# Patient Record
Sex: FEMALE | Race: BLACK OR AFRICAN AMERICAN | Employment: OTHER | URBAN - METROPOLITAN AREA
[De-identification: names, ages, dates, MRNs, and addresses within clinical notes are randomized per-mention and may not be internally consistent; named-entity substitution may affect disease eponyms.]

---

## 2018-11-23 ENCOUNTER — ED HISTORICAL/CONVERTED ENCOUNTER (OUTPATIENT)
Dept: OTHER | Age: 63
End: 2018-11-23

## 2019-05-20 ENCOUNTER — OP HISTORICAL/CONVERTED ENCOUNTER (OUTPATIENT)
Dept: OTHER | Age: 64
End: 2019-05-20

## 2020-05-06 ENCOUNTER — TELEPHONE (OUTPATIENT)
Dept: FAMILY MEDICINE CLINIC | Age: 65
End: 2020-05-06

## 2020-05-06 NOTE — TELEPHONE ENCOUNTER
----- Message from Darien Lloyd sent at 5/6/2020 12:13 PM EDT -----  Regarding: / telephone  Contact: [phone]  Caller's first and last name: n/a  Reason for call: voicemails  Callback required yes/no and why: yes  Best contact number(s): [phone]  Details to clarify the request:     Advised she is getting all the voicemails and would like to be contacted as soon as possible.

## 2022-01-25 ENCOUNTER — APPOINTMENT (OUTPATIENT)
Dept: GENERAL RADIOLOGY | Age: 67
End: 2022-01-25
Attending: STUDENT IN AN ORGANIZED HEALTH CARE EDUCATION/TRAINING PROGRAM
Payer: MEDICARE

## 2022-01-25 ENCOUNTER — HOSPITAL ENCOUNTER (EMERGENCY)
Age: 67
Discharge: HOME | End: 2022-01-25
Attending: STUDENT IN AN ORGANIZED HEALTH CARE EDUCATION/TRAINING PROGRAM
Payer: MEDICARE

## 2022-01-25 VITALS
OXYGEN SATURATION: 99 % | HEIGHT: 65 IN | HEART RATE: 93 BPM | DIASTOLIC BLOOD PRESSURE: 79 MMHG | RESPIRATION RATE: 17 BRPM | TEMPERATURE: 99 F | BODY MASS INDEX: 24.99 KG/M2 | WEIGHT: 150 LBS | SYSTOLIC BLOOD PRESSURE: 137 MMHG

## 2022-01-25 DIAGNOSIS — S93.04XA DISLOCATION OF RIGHT ANKLE JOINT, INITIAL ENCOUNTER: ICD-10-CM

## 2022-01-25 DIAGNOSIS — S82.851A CLOSED TRIMALLEOLAR FRACTURE OF RIGHT ANKLE, INITIAL ENCOUNTER: Primary | ICD-10-CM

## 2022-01-25 PROCEDURE — 73610 X-RAY EXAM OF ANKLE: CPT

## 2022-01-25 PROCEDURE — 99285 EMERGENCY DEPT VISIT HI MDM: CPT

## 2022-01-25 PROCEDURE — 75810000303 HC CLSD TRMT  FRACTURE/DISLOCATION W/  ANES

## 2022-01-25 PROCEDURE — 74011250636 HC RX REV CODE- 250/636: Performed by: STUDENT IN AN ORGANIZED HEALTH CARE EDUCATION/TRAINING PROGRAM

## 2022-01-25 PROCEDURE — 74011000250 HC RX REV CODE- 250: Performed by: STUDENT IN AN ORGANIZED HEALTH CARE EDUCATION/TRAINING PROGRAM

## 2022-01-25 RX ORDER — PROPOFOL 10 MG/ML
0.1 INJECTION, EMULSION INTRAVENOUS
Status: DISCONTINUED | OUTPATIENT
Start: 2022-01-25 | End: 2022-01-25 | Stop reason: DRUGHIGH

## 2022-01-25 RX ORDER — KETAMINE HYDROCHLORIDE 50 MG/ML
1 INJECTION, SOLUTION INTRAMUSCULAR; INTRAVENOUS ONCE
Status: COMPLETED | OUTPATIENT
Start: 2022-01-25 | End: 2022-01-25

## 2022-01-25 RX ORDER — OXYCODONE HYDROCHLORIDE 5 MG/1
5 TABLET ORAL
Qty: 12 TABLET | Refills: 0 | Status: SHIPPED | OUTPATIENT
Start: 2022-01-25 | End: 2022-01-28

## 2022-01-25 RX ORDER — PROPOFOL 10 MG/ML
1 INJECTION, EMULSION INTRAVENOUS
Status: COMPLETED | OUTPATIENT
Start: 2022-01-25 | End: 2022-01-25

## 2022-01-25 RX ADMIN — SODIUM CHLORIDE 1000 ML: 9 INJECTION, SOLUTION INTRAVENOUS at 19:05

## 2022-01-25 RX ADMIN — KETAMINE HYDROCHLORIDE 34 MG: 50 INJECTION INTRAMUSCULAR; INTRAVENOUS at 19:06

## 2022-01-25 RX ADMIN — PROPOFOL 68 MG: 10 INJECTION, EMULSION INTRAVENOUS at 19:06

## 2022-01-25 NOTE — ED PROVIDER NOTES
EMERGENCY DEPARTMENT HISTORY AND PHYSICAL EXAM      Date: 1/25/2022  Patient Name: Venson Moritz    History of Presenting Illness     Chief Complaint   Patient presents with    Fracture       HPI: Venson Moritz, 77 y.o. female with a past medical history of hypertension and hyperlipidemia presenting for concern for R ankle fracture. She is unsure on how she sustained that fracture however she does describe a mechanical fall. She went to an outside doctor who ordered an x-ray and showed a trimalleolar fracture with concern for dislocation and skin tenting. Patient has no numbness or cyanosis of the toes. Is able to wiggle her toes. She reports pain over the medial and lateral aspect of the ankle. PCP: Cipriano Emery MD    Current Outpatient Medications   Medication Sig Dispense Refill    oxyCODONE IR (Roxicodone) 5 mg immediate release tablet Take 1 Tablet by mouth every six (6) hours as needed for Pain for up to 3 days. Max Daily Amount: 20 mg. 12 Tablet 0    nebivolol (BYSTOLIC) 5 mg tablet Take  by mouth daily.  rosuvastatin (CRESTOR) 10 mg tablet Take 10 mg by mouth daily.            Medical History   I reviewed the medical, surgical, family, and social history, as well as allergies:    Past Medical History:  Past Medical History:   Diagnosis Date    Hyperlipidemia     Hypertension        Past Surgical History:  Past Surgical History:   Procedure Laterality Date    HX TOTAL ABDOMINAL HYSTERECTOMY         Family History:  Family History   Problem Relation Age of Onset    High Cholesterol Mother     Heart Surgery Mother 61        stent    Coronary Art Dis Mother     Hypertension Father     Hypertension Sister     Heart Surgery Brother 27    Heart Attack Brother     Coronary Art Dis Brother        Social History:  Social History     Tobacco Use    Smoking status: Former Smoker     Packs/day: 0.20     Years: 20.00     Pack years: 4.00     Types: Cigarettes    Smokeless tobacco: Never Used   Substance Use Topics    Alcohol use: Yes     Alcohol/week: 0.0 standard drinks     Comment: (3) 8oz of liquor    Drug use: No       Allergies:  No Known Allergies    Review of Systems     Review of Systems  All other systems negative. Physical Exam and Vital Signs   Vital Signs - Reviewed the patient's vital signs. Patient Vitals for the past 12 hrs:   Temp Pulse Resp BP SpO2   01/25/22 2011  (!) 104 16 (!) 163/78 99 %   01/25/22 1939  99 14 137/74 100 %   01/25/22 1927 99 °F (37.2 °C) 98 12 (!) 129/91 98 %   01/25/22 1926  99 (!) 99  100 %   01/25/22 1923  (!) 116 18 130/82 99 %   01/25/22 1911   17 112/85 100 %   01/25/22 1826 98.5 °F (36.9 °C) 92 18 125/78 100 %   01/25/22 1449 98.1 °F (36.7 °C) (!) 124 18 122/73 95 %       Physical Exam:    GENERAL: not in apparent distress  HEENT:  * EOMI  * Head atraumatic  CV:  * warm extremities  * no cyanosis  PULMONARY: no respiratory distress, non labored breathing, no audible wheezing or stridor, no accessory muscle use  ABDOMEN: soft, moving in bed and pulls to seated position without grimace or pain  EXTREMITIES/BACK: moving four extremities without limitation  JOINT: R ankle  * Splinted on arrival  * Noted swelling and deformity  * No warmth to palpation  * Noted tenderness to palpation  * Limited passive and active ROM due to pain  * unable to bear weight  * normal DP and PT pulses and sensation and movement of toes  SKIN: no rashes or signs of trauma  NEURO:  * Speech clear  * GCS 15      Medical Decision Making and ED Course   - I am the first and primary provider for this patient and am the primary provider of record. - I reviewed the vital signs, available nursing notes, past medical history, past surgical history, family history and social history. - Initial assessment performed. The patients presenting problems have been discussed, and the staff are in agreement with the care plan formulated and outlined with them.   I have encouraged them to ask questions as they arise throughout their visit. - Available medical records, nursing notes, old EKGs, and EMS run sheets (if patient was EMS transported) were reviewed    MDM:   Patient is a 77 y.o. female presenting for R ankle deformity. Vitals reveal tachycardia and physical exam reveals deformity of R ankle  without neurovascular compromise. Based on the history, physical exam, risk factors, and vitals signs, differential includes: sprain, strain, ligamentous injury, fracture, dislocation, soft tissue contusion. Nerve and vascular damage is unlikely as the patient has a completely benign neurovascular exam. Presentation is very likely an ankle fracture. XR ordered. Results     Labs:  No results found for this or any previous visit (from the past 12 hour(s)). Radiologic Studies:  CT Results  (Last 48 hours)    None        CXR Results  (Last 48 hours)    None          Medications ordered:  Medications   ketamine (KETALAR) 50 mg/mL injection 68 mg (34 mg IntraVENous Given 1/25/22 1906)   sodium chloride 0.9 % bolus infusion 1,000 mL (1,000 mL IntraVENous New Bag 1/25/22 1905)   propofoL (DIPRIVAN) 10 mg/mL injection 68 mg (68 mg IntraVENous Given 1/25/22 1906)        ED Course     ED Course:     ED Course as of 01/25/22 2058 Tue Jan 25, 2022   1600 XR: Trimalleolar fracture dislocation right ankle. [SS]   3233 Dr. Sabrina Colon assessed patient at bedside. Outpatient follow up after ED reduction. Will do proc sedation for reduction and apply sugartong and post splint. [SS]   1932 Patient tolerated reduction under proc sedation well. Observing in Ed till return to baseline mentation. [SS]   2055 Despite the patient still having retained fracture of the lower aspect of the fibula, the trimalleolar lateral displacement fracture has been reduced.   Will discharge with follow-up with orthopedics for further management. [SS]   2055 After the management of the injury as documented above and application of the splint, repeat exam shows an intact neurovascular exam of the R foot without any cyanosis or other findings. Patient denies any worsening pain, numbness, or color change. Patient was given instructions to look out for cyanosis/skin color change, numbness, weakness, or pain and return immediately in case it happens or go to the [facility] ED.   [SS]      ED Course User Index  [SS] Jerod Will MD         Final Disposition     Disposition: Condition stable  DC- Adult Discharges: All of the diagnostic tests were reviewed and questions answered. Diagnosis, care plan and treatment options were discussed. The patient understands the instructions and will follow up as directed. The patients results have been reviewed with them. They have been counseled regarding their diagnosis. The patient verbally convey understanding and agreement of the signs, symptoms, diagnosis, treatment and prognosis and additionally agrees to follow up as recommended with their PCP in 24 - 48 hours. They also agree with the care-plan and convey that all of their questions have been answered. I have also put together some discharge instructions for them that include: 1) educational information regarding their diagnosis, 2) how to care for their diagnosis at home, as well a 3) list of reasons why they would want to return to the ED prior to their follow-up appointment, should their condition change. DISCHARGE PLAN:  1. Current Discharge Medication List      START taking these medications    Details   oxyCODONE IR (Roxicodone) 5 mg immediate release tablet Take 1 Tablet by mouth every six (6) hours as needed for Pain for up to 3 days. Max Daily Amount: 20 mg.  Qty: 12 Tablet, Refills: 0    Associated Diagnoses: Closed trimalleolar fracture of right ankle, initial encounter         CONTINUE these medications which have NOT CHANGED    Details   nebivolol (BYSTOLIC) 5 mg tablet Take  by mouth daily.         rosuvastatin (CRESTOR) 10 mg tablet Take 10 mg by mouth daily. 2.   Follow-up Information     Follow up With Specialties Details Why Maria Isabel Bertrand MD Orthopedic Surgery Schedule an appointment as soon as possible for a visit in 3 days  [address] [place] [facility] [address] [phone] [address] [facility] EMERGENCY DEPT Emergency Medicine Go to  If symptoms worsen [address] [facility] [address] [phone]        3. Return to ED if worse   4. Current Discharge Medication List      START taking these medications    Details   oxyCODONE IR (Roxicodone) 5 mg immediate release tablet Take 1 Tablet by mouth every six (6) hours as needed for Pain for up to 3 days. Max Daily Amount: 20 mg.  Qty: 12 Tablet, Refills: 0  Start date: 1/25/2022, End date: 1/28/2022    Associated Diagnoses: Closed trimalleolar fracture of right ankle, initial encounter             ED Procedures & Consultations   Performed by: Alexandro Pearson MD  Procedures       PROCEDURE: Procedural Sedation for R ankle reduction of fracture-dislocation    Performed by: Alexandro Pearson MD  Date: 1/25/2022    - Indication: R ankle fracture  - Chart was reviewed for allergies and medical history. - consent obtained from patient prior to the procedure. Indications, risks, and benefits were explained at length including cardiac arrest, hypotension, hypoxia, intubation, vomiting, allergic reaction, hemodynamic compromise, etc.    PRE-PROCEDURAL ASSESSMENT  - Consent obtained as above  - Equipment readily available at bedside included multiple oxygen sources, intubation equipment, BMV, ETCO2, monitors, suction, bougie, Glidoscope, cric kit, medications.  - Nursing and support staff present at bedside.  - The patient was placed on a cardiac monitor including continuous pulse oximetry. Hemodynamic status was optimized as much as possible prior to initiating conscious sedation procedure.   - Patient placed on nasal cannula at 2L O2 with capnography. - Pre-procedural assessment was done to assess for dentures, intraoral foreign bodies, anatomy, secretions, and other conditions that may cause difficulty in endotracheal intubation. PROCEDURAL SEDATION  - An appropriate time out was taken documenting proper side and proper patient. My hands were washed immediately prior to the procedure. - Medications required: propofol, ketamine  - Complication: Tolerated well without complication. no hypoxic episodes. - Time: Total intra-service time with patient was 30 minutes. POST-PROCEDURE REASSESSMENT  - Patient tolerated procedure well and is back to baseline.  - Patient returned to pre-sedation level of awareness.  - Normal vitals signs noted. - Respiratory function, cardiovascular function, temperature, and mental status returned to pre-anesthetic state.  - Postprocedural pain was controlled with oxycodone  - The patient did tolerate p.o. intake    PROCEDURE: Reduction of R ankle    Performed by: Reid Perez MD  Date: 1/25/2022    - Indication: R ankle fracture dislocation  - consent obtained from patient prior to the procedure. Indications, risks, and benefits were explained at length including pain, fracture, failed reduction, neurovascular injury, etc..    An appropriate time out was taken documenting proper side and proper patient. My hands were washed immediately prior to the procedure. - Location details: R ankle  - XR results: trimal fx with dislocation  - Anesthesia: proc sedation as above  - Sedation required? yes  - Reduction Procedure: axial pull and closed manipulation  - Results: post reduction alignement improved  - Complication: Tolerated well without complication. <2sec cap refill. Tendons intact. Normal neurovascular exam.   - Repeat XR: improved alignment - see report for full details          Diagnosis     Clinical Impression:   1.  Closed trimalleolar fracture of right ankle, initial encounter 2. Dislocation of right ankle joint, initial encounter        Attestations:    Patricia Aguilar MD    Please note that this dictation was completed with Quigo, the computer voice recognition software. Quite often unanticipated grammatical, syntax, homophones, and other interpretive errors are inadvertently transcribed by the computer software. Please disregard these errors. Please excuse any errors that have escaped final proofreading. Thank you.

## 2022-01-25 NOTE — CONSULTS
ORTHOPEDIC CONSULT    Patient: Sesar Moreno MRN: [id number]  SSN: [id number]    YOB: 1955  Age: 77 y.o. Sex: female      Subjective:      Sesar Moreno is a 77 y.o. female who is being seen in orthopedic consultation in the emergency room for a right ankle fracture. The patient states on Thursday, January 20, 2022 she tripped and fell causing injury to her right ankle. The patient states she was unable to get up on her own. Her  did assist her with getting up.  states since that time he has been keeping her leg elevated and she has been nonweightbearing of right lower extremity. He says today she has been having increasing pain and inability to ambulate. The patient was seen at [facility] and referred to [facility]. She was seen at [facility] today and was told that she needed surgical intervention and was referred to the emergency room. Prior to coming to the emergency room the patient stopped and had lunch consisting of half of a cheeseburger and fries. Patient now complaining of moderate pain of her right lower extremity. It is tolerable. She denies any numbness or tingling of her right lower extremity. She denies any right knee or right hip pain. She denies any injury to head or cervical spine from the fall. She denies any dizziness or lightheadedness prior to falling. She denies a history of frequent falls. She is a household and community ambulator without the use of any assistive devices. She denies any other musculoskeletal complaints at this time. Patient's injury is almost 3to 11 days old with the fracture has not been reduced. Patient has been nonweightbearing.   Patient denies any localization of injury      Past Medical History:   Diagnosis Date    Hyperlipidemia     Hypertension      Past Surgical History:   Procedure Laterality Date    HX TOTAL ABDOMINAL HYSTERECTOMY        Family History   Problem Relation Age of Onset    High Cholesterol Mother     Heart Surgery Mother 61        stent    Coronary Art Dis Mother     Hypertension Father     Hypertension Sister     Heart Surgery Brother 27    Heart Attack Brother     Coronary Art Dis Brother      Social History     Tobacco Use    Smoking status: Former Smoker     Packs/day: 0.20     Years: 20.00     Pack years: 4.00     Types: Cigarettes    Smokeless tobacco: Never Used   Substance Use Topics    Alcohol use: Yes     Alcohol/week: 0.0 standard drinks     Comment: (3) 8oz of liquor      Prior to Admission medications    Medication Sig Start Date End Date Taking? Authorizing Provider   nebivolol (BYSTOLIC) 5 mg tablet Take  by mouth daily. Provider, Historical   rosuvastatin (CRESTOR) 10 mg tablet Take 10 mg by mouth daily. Provider, Historical       No Known Allergies    Review of Systems:  Review of Systems   Constitutional: Negative. HENT: Negative. Eyes: Negative. Cardiovascular: Negative. Gastrointestinal: Negative. Genitourinary: Negative. Musculoskeletal: Positive for joint pain. Right lower extremity   Skin: Negative. Neurological: Negative. Endo/Heme/Allergies: Negative. Psychiatric/Behavioral: Negative. Objective:     Current Facility-Administered Medications   Medication Dose Route Frequency    ketamine (KETALAR) 50 mg/mL injection 68 mg  1 mg/kg IntraVENous ONCE    propofoL (DIPRIVAN) 10 mg/mL injection 6.8 mg  0.1 mg/kg IntraVENous NOW     Current Outpatient Medications   Medication Sig    nebivolol (BYSTOLIC) 5 mg tablet Take  by mouth daily.  rosuvastatin (CRESTOR) 10 mg tablet Take 10 mg by mouth daily. Vitals:    01/25/22 1449   BP: 122/73   Pulse: (!) 124   Resp: 18   Temp: 98.1 °F (36.7 °C)   SpO2: 95%   Weight: 68 kg (150 lb)   Height: 5' 5\" (1.651 m)        Alert and oriented x3, No apparent distress    Physical Exam:  Right lower extremity: Splint was removed by me.   The patient had a grossly valgus deformed ankle with tenting of the skin on the medial aspect. There is fractures blister present over the medial malleolus. Moderate swelling seen of right ankle. Moderate erythema seen throughout right ankle. Moderate swelling seen of right foot. DP/PT pulses was palpable. Cap refill is 2 seconds. No calf pain to palpation. Full range of motion of her right knee hip without tenderness. Right lower extremity appears neurovascularly intact. Labs:  CBC:No results for input(s): WBC, RBC, HGB, HCT, MCV, RDW, PLT, HGBEXT, HCTEXT, PLTEXT in the last 72 hours. CHEMISTRIES:No results for input(s): NA, K, CL, CO2, BUN, CREA, CA, PHOS, MG in the last 72 hours. No lab exists for component: GLUCOSEPT/INR:No results for input(s): INR, INREXT in the last 72 hours. No lab exists for component: PROTIME  APTT:No results for input(s): APTT in the last 72 hours. LIVER PROFILE:No results for input(s): AST, ALT in the last 72 hours. No lab exists for component: BILIDIR, BILITOT, ALKPHOS    IMAGING:  X-rays taken of her right ankle show a displaced bimalleolar fracture. Minimal osteopenia seen throughout right lower extremity. Assessment/Plan:     Hospital Problems  Date Reviewed: 3/1/2012    None        Displaced bimalleolar right ankle fracture. This patient will need urgent reduction to reduce skin tenting. The patient will need surgical intervention as well. I explained to the patient because of her fracture blisters and moderate swelling we will not be able to perform surgical intervention at this time. We will need for the fracture blisters to resolve as well as the swelling to resolve before attempting surgical fixation. Discussed with ER attending. Reduction will be performed in emergency room. Patient will be resplinted and she will follow-up as an outpatient with Dr. Joycelyn Ryan in approximately 1 week. I explained to her the importance of nonweightbearing of her right lower extremity.   She expressed full understanding as well as her . This patient was examined direct consult with Dr. Sweta Carr. Thank you for the courtesy of this consult. Signed By: Carla Mays PA-C     January 25, 2022      Patient was seen by orthopedic MD in the ER. Above findings and x-rays reviewed with the patient at length. Care plan was also discussed with the ER attending who is going to do conscious sedation and reduce patient's ankle and foot are back after x-rays in the splint. Patient will be reexamined in the office on Friday to check the skin and then once the skin is doing better patient will plan for surgical care likely next week or the following week. The surgery can be done on outpatient basis. All questions were answered. Patient and  agree and understand the treatment plan.   Signed By: Lalo Deluca MD     January 25, 2022

## 2022-01-25 NOTE — ED TRIAGE NOTES
Pt was seen at [facility] today, who sent her to ED for fractured right ankle. Pt reports that \"bone is about to pop through skin\"    Injury happened last Thursday.

## 2022-01-25 NOTE — DISCHARGE INSTRUCTIONS
Thank you! Thank you for allowing me to care for you in the emergency department. I sincerely hope that you are satisfied with your visit today. It is my goal to provide you with excellent care. Below you will find a list of your labs and imaging from your visit today. Should you have any questions regarding these results please do not hesitate to call the emergency department. Labs -   No results found for this or any previous visit (from the past 12 hour(s)). Radiologic Studies -   XR ANKLE RT MIN 3 V   Final Result   No persistent lateral posterior subluxation of trimalleolar fracture         XR ANKLE RT MIN 3 V   Final Result   Trimalleolar fracture dislocation right ankle. CT Results  (Last 48 hours)      None          CXR Results  (Last 48 hours)      None               If you feel that you have not received excellent quality care or timely care, please ask to speak to the nurse manager. Please choose us in the future for your continued health care needs. ------------------------------------------------------------------------------------------------------------  The exam and treatment you received in the Emergency Department were for an urgent problem and are not intended as complete care. It is important that you follow-up with a doctor, nurse practitioner, or physician assistant to:  (1) confirm your diagnosis,  (2) re-evaluation of changes in your illness and treatment, and  (3) for ongoing care. If your symptoms become worse or you do not improve as expected and you are unable to reach your usual health care provider, you should return to the Emergency Department. We are available 24 hours a day. Please take your discharge instructions with you when you go to your follow-up appointment. If a prescription has been provided, please have it filled as soon as possible to prevent a delay in treatment.  Read the entire medication instruction sheet provided to you by the pharmacy. If you have any questions or reservations about taking the medication due to side effects or interactions with other medications, please call your primary care physician or contact the ER to speak with the charge nurse. Make an appointment with your family doctor or the physician you were referred to for follow-up of this visit as instructed on your discharge paperwork, as this is a mandatory follow-up. Return to the ER if you are unable to be seen or if you are unable to be seen in a timely manner. If you have any problem arranging the follow-up visit, contact the Emergency Department immediately.

## 2022-01-26 NOTE — ED NOTES
Bedside report to Prabhu Flores, [address] [facility]. Pt  at bedside.  stepped out the room during the reduction. Pt awake and talking on the phone.

## 2022-06-15 ENCOUNTER — TRANSCRIBE ORDER (OUTPATIENT)
Dept: SCHEDULING | Age: 67
End: 2022-06-15

## 2022-06-15 DIAGNOSIS — Z12.31 SCREENING MAMMOGRAM FOR HIGH-RISK PATIENT: Primary | ICD-10-CM

## 2022-06-15 DIAGNOSIS — M81.0 SENILE OSTEOPOROSIS: ICD-10-CM

## 2022-07-28 ENCOUNTER — HOSPITAL ENCOUNTER (OUTPATIENT)
Dept: MAMMOGRAPHY | Age: 67
Discharge: HOME | End: 2022-07-28
Payer: MEDICARE

## 2022-07-28 DIAGNOSIS — Z12.31 SCREENING MAMMOGRAM FOR HIGH-RISK PATIENT: ICD-10-CM

## 2022-07-28 DIAGNOSIS — M81.0 SENILE OSTEOPOROSIS: ICD-10-CM

## 2022-07-28 PROCEDURE — 77080 DXA BONE DENSITY AXIAL: CPT

## 2022-07-28 PROCEDURE — 77063 BREAST TOMOSYNTHESIS BI: CPT

## 2022-12-16 ENCOUNTER — ANESTHESIA EVENT (OUTPATIENT)
Dept: ENDOSCOPY | Age: 67
End: 2022-12-16
Payer: MEDICARE

## 2022-12-16 ENCOUNTER — ANESTHESIA (OUTPATIENT)
Dept: ENDOSCOPY | Age: 67
End: 2022-12-16
Payer: MEDICARE

## 2022-12-16 ENCOUNTER — HOSPITAL ENCOUNTER (OUTPATIENT)
Age: 67
Setting detail: OUTPATIENT SURGERY
Discharge: HOME | End: 2022-12-16
Attending: INTERNAL MEDICINE | Admitting: INTERNAL MEDICINE
Payer: MEDICARE

## 2022-12-16 VITALS
TEMPERATURE: 97.8 F | HEIGHT: 65 IN | RESPIRATION RATE: 22 BRPM | BODY MASS INDEX: 25.23 KG/M2 | WEIGHT: 151.46 LBS | HEART RATE: 88 BPM | SYSTOLIC BLOOD PRESSURE: 129 MMHG | OXYGEN SATURATION: 99 % | DIASTOLIC BLOOD PRESSURE: 99 MMHG

## 2022-12-16 LAB
H PYLORI FROM TISSUE: POSITIVE
KIT LOT NO., HCLOLOT: ABNORMAL
NEGATIVE CONTROL: NEGATIVE
POSITIVE CONTROL: POSITIVE

## 2022-12-16 PROCEDURE — 74011250636 HC RX REV CODE- 250/636: Performed by: INTERNAL MEDICINE

## 2022-12-16 PROCEDURE — 74011000258 HC RX REV CODE- 258: Performed by: NURSE ANESTHETIST, CERTIFIED REGISTERED

## 2022-12-16 PROCEDURE — 77030021593 HC FCPS BIOP ENDOSC BSC -A: Performed by: INTERNAL MEDICINE

## 2022-12-16 PROCEDURE — 76060000031 HC ANESTHESIA FIRST 0.5 HR: Performed by: INTERNAL MEDICINE

## 2022-12-16 PROCEDURE — 88342 IMHCHEM/IMCYTCHM 1ST ANTB: CPT

## 2022-12-16 PROCEDURE — 2709999900 HC NON-CHARGEABLE SUPPLY: Performed by: INTERNAL MEDICINE

## 2022-12-16 PROCEDURE — 74011000250 HC RX REV CODE- 250: Performed by: NURSE ANESTHETIST, CERTIFIED REGISTERED

## 2022-12-16 PROCEDURE — 76040000019: Performed by: INTERNAL MEDICINE

## 2022-12-16 PROCEDURE — 88305 TISSUE EXAM BY PATHOLOGIST: CPT

## 2022-12-16 PROCEDURE — 87077 CULTURE AEROBIC IDENTIFY: CPT | Performed by: INTERNAL MEDICINE

## 2022-12-16 PROCEDURE — 74011250636 HC RX REV CODE- 250/636: Performed by: NURSE ANESTHETIST, CERTIFIED REGISTERED

## 2022-12-16 RX ORDER — EPINEPHRINE 0.1 MG/ML
1 INJECTION INTRACARDIAC; INTRAVENOUS
Status: DISCONTINUED | OUTPATIENT
Start: 2022-12-16 | End: 2022-12-16 | Stop reason: HOSPADM

## 2022-12-16 RX ORDER — CALCIUM CARBONATE/VITAMIN D3 500 MG-10
TABLET ORAL
COMMUNITY

## 2022-12-16 RX ORDER — CHOLECALCIFEROL (VITAMIN D3) 125 MCG
CAPSULE ORAL
COMMUNITY

## 2022-12-16 RX ORDER — SODIUM CHLORIDE 9 MG/ML
50 INJECTION, SOLUTION INTRAVENOUS CONTINUOUS
Status: DISCONTINUED | OUTPATIENT
Start: 2022-12-16 | End: 2022-12-16 | Stop reason: HOSPADM

## 2022-12-16 RX ORDER — FLUMAZENIL 0.1 MG/ML
0.2 INJECTION INTRAVENOUS
Status: DISCONTINUED | OUTPATIENT
Start: 2022-12-16 | End: 2022-12-16 | Stop reason: HOSPADM

## 2022-12-16 RX ORDER — MIDAZOLAM HYDROCHLORIDE 1 MG/ML
.25-5 INJECTION, SOLUTION INTRAMUSCULAR; INTRAVENOUS
Status: DISCONTINUED | OUTPATIENT
Start: 2022-12-16 | End: 2022-12-16 | Stop reason: HOSPADM

## 2022-12-16 RX ORDER — NALOXONE HYDROCHLORIDE 0.4 MG/ML
0.4 INJECTION, SOLUTION INTRAMUSCULAR; INTRAVENOUS; SUBCUTANEOUS
Status: DISCONTINUED | OUTPATIENT
Start: 2022-12-16 | End: 2022-12-16 | Stop reason: HOSPADM

## 2022-12-16 RX ORDER — LIDOCAINE HYDROCHLORIDE 20 MG/ML
INJECTION, SOLUTION EPIDURAL; INFILTRATION; INTRACAUDAL; PERINEURAL AS NEEDED
Status: DISCONTINUED | OUTPATIENT
Start: 2022-12-16 | End: 2022-12-16 | Stop reason: HOSPADM

## 2022-12-16 RX ORDER — SODIUM CHLORIDE 9 MG/ML
INJECTION, SOLUTION INTRAVENOUS
Status: DISCONTINUED | OUTPATIENT
Start: 2022-12-16 | End: 2022-12-16 | Stop reason: HOSPADM

## 2022-12-16 RX ORDER — LISINOPRIL 5 MG/1
TABLET ORAL
COMMUNITY
Start: 2022-11-04

## 2022-12-16 RX ORDER — ROSUVASTATIN CALCIUM 40 MG/1
TABLET, COATED ORAL
COMMUNITY
Start: 2022-11-04

## 2022-12-16 RX ORDER — PROPOFOL 10 MG/ML
INJECTION, EMULSION INTRAVENOUS
Status: DISCONTINUED | OUTPATIENT
Start: 2022-12-16 | End: 2022-12-16 | Stop reason: HOSPADM

## 2022-12-16 RX ORDER — PROPOFOL 10 MG/ML
INJECTION, EMULSION INTRAVENOUS AS NEEDED
Status: DISCONTINUED | OUTPATIENT
Start: 2022-12-16 | End: 2022-12-16 | Stop reason: HOSPADM

## 2022-12-16 RX ORDER — FENTANYL CITRATE 50 UG/ML
100 INJECTION, SOLUTION INTRAMUSCULAR; INTRAVENOUS
Status: DISCONTINUED | OUTPATIENT
Start: 2022-12-16 | End: 2022-12-16 | Stop reason: HOSPADM

## 2022-12-16 RX ORDER — ASPIRIN 81 MG/1
162 TABLET ORAL DAILY
COMMUNITY

## 2022-12-16 RX ORDER — ATROPINE SULFATE 0.1 MG/ML
0.5 INJECTION INTRAVENOUS
Status: DISCONTINUED | OUTPATIENT
Start: 2022-12-16 | End: 2022-12-16 | Stop reason: HOSPADM

## 2022-12-16 RX ORDER — DEXTROMETHORPHAN/PSEUDOEPHED 2.5-7.5/.8
1.2 DROPS ORAL
Status: DISCONTINUED | OUTPATIENT
Start: 2022-12-16 | End: 2022-12-16 | Stop reason: HOSPADM

## 2022-12-16 RX ADMIN — LIDOCAINE HYDROCHLORIDE 100 MG: 20 INJECTION, SOLUTION EPIDURAL; INFILTRATION; INTRACAUDAL; PERINEURAL at 13:00

## 2022-12-16 RX ADMIN — FENTANYL CITRATE 25 MCG: 50 INJECTION, SOLUTION INTRAMUSCULAR; INTRAVENOUS at 13:00

## 2022-12-16 RX ADMIN — SODIUM CHLORIDE: 9 INJECTION, SOLUTION INTRAVENOUS at 12:53

## 2022-12-16 RX ADMIN — PROPOFOL 100 MCG/KG/MIN: 10 INJECTION, EMULSION INTRAVENOUS at 13:00

## 2022-12-16 RX ADMIN — PROPOFOL 80 MG: 10 INJECTION, EMULSION INTRAVENOUS at 13:00

## 2022-12-16 NOTE — ANESTHESIA POSTPROCEDURE EVALUATION
Procedure(s):  ESOPHAGOGASTRODUODENOSCOPY (EGD)  COLONOSCOPY  ESOPHAGOGASTRODUODENAL (EGD) BIOPSY. MAC    Anesthesia Post Evaluation      Multimodal analgesia: multimodal analgesia not used between 6 hours prior to anesthesia start to PACU discharge  Patient location during evaluation: bedside  Patient participation: complete - patient participated  Level of consciousness: awake  Pain score: 0  Pain management: satisfactory to patient  Airway patency: patent  Anesthetic complications: no  Cardiovascular status: acceptable  Respiratory status: acceptable  Hydration status: acceptable  Post anesthesia nausea and vomiting:  controlled  Final Post Anesthesia Temperature Assessment:  Normothermia (36.0-37.5 degrees C)      INITIAL Post-op Vital signs:   Vitals Value Taken Time   /99 12/16/22 1341   Temp 36.6 °C (97.8 °F) 12/16/22 1330   Pulse 87 12/16/22 1343   Resp 21 12/16/22 1343   SpO2 100 % 12/16/22 1344   Vitals shown include unvalidated device data.

## 2022-12-16 NOTE — PROGRESS NOTES
[facility]  1955  [id number]    Situation:  Verbal report received from: Vinny Collins RN  Procedure: Procedure(s):  ESOPHAGOGASTRODUODENOSCOPY (EGD)  COLONOSCOPY  ESOPHAGOGASTRODUODENAL (EGD) BIOPSY    Background:    Preoperative diagnosis: NONSPECIFIC ABNORMAL FINDING IN STOOL CONTENTS  Postoperative diagnosis: 1. diverticulosis  2. gastric erosion    :  Dr. Danella Duverney  Assistant(s): Endoscopy Technician-1: Dorota Flores  Endoscopy RN-1: Dwayne Joiner RN    Specimens:   ID Type Source Tests Collected by Time Destination   1 : gastric antrum bx Preservative   Zulay Glass MD 12/16/2022 1305 Pathology   2 : transverse colon polyp Preservative   Zulay Glass MD 12/16/2022 1314 Pathology     H. Pylori  yes    Assessment:  Intra-procedure medications     Anesthesia gave intra-procedure sedation and medications, see anesthesia flow sheet yes    Intravenous fluids: NS@ KVO     Vital signs stable   yes    Abdominal assessment: round and soft   yes    Recommendation:  Discharge patient per MD order  yes.   Return to floor  outpatient   Family or Friend   spouse   Permission to share finding with family or friend yes

## 2022-12-16 NOTE — H&P
Patient Name: Sravan Serrano  Gender: Female   (age): 1955 (79)       Referring Physician:    Wong Sarmiento. [address] [place] [address] Ave  [phone] (phone)  [phone] (fax)     Chief Complaint:    abnormal stool contents     History of Present Illness:     Personal history of coronary artery disease; uses 650 mg aspirin daily. Does not have indigestion, heartburn, abdominal pain. Recent physical exam; occult blood positive in bowel movements. Does not have chest pain, difficulty breathing, extremity edema. Past Medical History  Medical Conditions:   [id number]  High blood pressure  High cholesterol  Surgical Procedures:   open heart surgery  Dx Studies:   Colonoscopy, 2013  Medications:   aspirin 650 mg Take 1 tablet by mouth once a day  lisinopril 5 mg Take 1 tablet by mouth once a day  rosuvastatin 40 mg Take 1 tablet by mouth once a day  Allergies:   Patient has no known allergies  Immunizations:   COVID Vaccine  Influenza vaccination (refused)  Influenza, seasonal, injectable (refused)  Social History  Alcohol:   Alcohol consumption: Weekly. Tobacco:   Former smoker  Drugs:   Former. Exercise:   Exercise less than 3 times a week. Caffeine:   Weekly. Family History   No history of Colon Cancer, Colon Polyps, Esophogeal Cancer, GI Cancers, IBD (Crohn's or UC), Liver disease  Mother: Diagnosed with Breast Cancer;  Review of Systems:  Cardiovascular: Denies chest pain, irregular heart beat, palpitations, peripheral edema, syncope, Sweats. Constitutional: Denies fatigue, fever, loss of appetite, weight gain, weight loss. ENMT: Denies nose bleeds, sore throat, hearing loss. Endocrine: Denies excessive thirst, heat intolerance. Eyes: Denies loss of vision.   Gastrointestinal: Denies abdominal pain, abdominal swelling, change in bowel habits, constipation, diarrhea, Bloating/gas, heartburn, jaundice, nausea, rectal bleeding, stomach cramps, vomiting, dysphagia, rectal pain, Stool incontinence, hematemesis. Genitourinary: Denies dark urine, dysuria, frequent urination, hematuria, incontinence. Hematologic/Lymphatic: Denies easy bruising, prolonged bleeding. Integumentary: Denies itching, rashes, sun sensitivity. Musculoskeletal: Denies arthritis, back pain, gout, joint pain, muscle weakness, stiffness. Neurological: Presents suffers from dizziness. Denies fainting, frequent headaches, memory loss. Psychiatric: Denies anxiety, depression, difficulty sleeping, hallucinations, nervousness, panic attacks, paranoia. Respiratory: Denies cough, dyspnea, wheezing. Vital Signs:  See nursing notes    Physical Exam:  Constitutional:  Appearance: No distress, appears comfortable. Skin:  Inspection: No rash, no jaundice. Head/face: Inspection: Normacephalic, atraumatic. Eyes:  Conjunctivae/lids: Normal.  ENMT:  External: Normal.  Neck:  Neck: Normal appearance, trachea midline. Respiratory:  Effort: Normal respiratory effort, comfortable, speaks in complete sentences. Auscultation: normal breath sounds, no rubs, wheezes or rhonchi. Cardiovascular: Auscultation: normal, S1 and S2,no gallops,no rubs or murmurs. Gastrointestinal/Abdomen:  Abdomen: non-distended, nontender. Liver/Spleen: normal,normal size,Liver size and consistency normal, spleen is non-palpable. Musculoskeletal:  Gait/station: normal.  Muscles: normal strength and tone, no atrophy or abnormal movements. Psychiatric:  Judgment/insight: Normal,normal judgement, normal insight. Mood and affect: Normal mood, affect full,no evidence of depression, anxiety or agitation. Lymphatic:  Neck: No lymphadenopathy in the cervical or supraclavicular chain.       Impressions:   Nonspecific abnormal finding in stool contents    Plan:   I've discussed EGD, colonoscopy possible biopsy, polypectomy, cautery, injection, alternatives, complications including but not limited to pain, cardiopulmonary event, bleeding, perforation requiring additional blood transfusion or operative repair; all questions answered.

## 2022-12-16 NOTE — PROGRESS NOTES
Endoscopy discharge instructions have been reviewed and given to patient. The patient verbalized understanding and acceptance of instructions. Dr. Stephanie Morrow discussed with patient procedure findings and next steps.

## 2022-12-16 NOTE — DISCHARGE INSTRUCTIONS
Roland Silva  [id number]  1955    DISCHARGE INSTRUCTIONS    Results:  H. pylori gastritis, colonic diverticulosis seen; no diverticulitis  Discomfort:  Redness at IV site- apply warm compress to area; if redness or soreness persist- contact your physician. There may be a slight amount of blood passed from the rectum. Gaseous discomfort - walking, belching will help relieve any discomfort. You may not operate a vehicle for 12 hours. You may not engage in an occupation involving machinery or appliances for rest of today. You may not drink alcoholic beverages for at least 12 hours. Avoid making any critical decisions for at least 24 hours. DIET:   High fiber diet. Medications: For 2 weeks used twice daily each 20 mg omeprazole, 1000 mg amoxicillin, 500 mg metronidazole              Resume usual medications today   ACTIVITY:  You may resume your normal daily activities it is recommended that you spend the remainder of the day resting -  avoid any strenuous activity. CALL M.D.   ANY SIGN OF:   Increasing pain, nausea, vomiting  Abdominal distension (swelling)  New increased bleeding (oral or rectal)  Fever (chills)  Pain in chest area  Bloody discharge from nose or mouth  Shortness of breath     Follow-up Instructions:  Call Dr. Yovani Kilgore if there are any problems with medication administration  No routine follow-up colonoscopy necessary    DISCHARGE SUMMARY from Nurse    The following personal items collected during your admission are returned to you:   Dental Appliance: Dental Appliances: None  Vision: Visual Aid: Glasses, Sent home  Hearing Aid:    Jewelry:    Clothing:    Other Valuables:    Valuables sent to safe:

## 2022-12-16 NOTE — PROCEDURES
Kelvin Garcia M.D. 2022    Esophagogastroduodenoscopy (EGD) Colonoscopy Procedure Note  Reyes Banco  : 1955  [facility] Medical Record Number: [id number]      Indications:    Abnormal bowel contents  Referring Physician:  Joaquin Tellez MD  Anesthesia/Sedation: see nursing notes  Endoscopist:  Dr. Pamela Mujica  Assistants: None  Permit:  The indications, risks, benefits and alternatives were reviewed with the patient or their decision maker who was provided an opportunity to ask questions and all questions were answered. The specific risks of esophagogastroduodenoscopy with conscious sedation were reviewed, including but not limited to anesthetic complication, bleeding, adverse drug reaction, missed lesion, infection, IV site reactions, and intestinal perforation which would lead to the need for surgical repair. Alternatives to EGD including radiographic imaging, observation without testing, or laboratory testing were reviewed as well as the limitations of those alternatives discussed. After considering the options and having all their questions answered, the patient or their decision maker provided both verbal and written consent to proceed. Procedure in Detail:  After obtaining informed consent, positioning of the patient in the left lateral decubitus position, and conduction of a pre-procedure pause or \"time out\" the endoscope was introduced into the mouth and advanced to the duodenum. A careful inspection was made, and findings or interventions are described below.     Findings:   Esophagus:normal  Stomach: Scattered antral erosion without focal ulcer or mass-effect  Duodenum/jejunum: normal    Complications/estimated blood loss: none    Therapies:  biopsy of stomach antrum    Specimens:  Biopsies    Implants: None           Endoscopist:  Dr. Pamela Mujica  Assistants: None  Complications:  None  Estimate Blood Loss:  None    Colonoscopy is to follow    Permit:  The indications, risks, benefits and alternatives were reviewed with the patient or their decision maker who was provided an opportunity to ask questions and all questions were answered. The specific risks of colonoscopy with conscious sedation were reviewed, including but not limited to anesthetic complication, bleeding, adverse drug reaction, missed lesion, infection, IV site reactions, and intestinal perforation which would lead to the need for surgical repair. Alternatives to colonoscopy including radiographic imaging, observation without testing, or laboratory testing were reviewed including the limitations of those alternatives. After considering the options and having all their questions answered, the patient or their decision maker provided both verbal and written consent to proceed. Procedure in Detail:  After obtaining informed consent, positioning of the patient in the left lateral decubitus position, and conduction of a pre-procedure pause or \"time out\" the endoscope was introduced into the anus and advanced to the terminal ileum. The quality of the colonic preparation was good. A careful inspection was made as the colonoscope was withdrawn, findings and interventions are described below. Appendiceal orifice photographed    Findings:       - Diverticulosis too numerous to count entire length of the colon; no inflammatory changes, colon polyps, mass lesions identified  No polyps identified    Specimens:    none    Implants: None    Complications:   None; patient tolerated the procedure well. Estimated blood loss: none    Impression:  Age pylori gastritis  Colonic diverticulosis without diverticulitis    Recommendations:   2 weeks twice daily each omeprazole 20 mg, amoxicillin 1 g, metronidazole 500 mg.   Follow-up with Dr. Berg Seen for any new specific problems    Thank you for entrusting me with this patient's care. Please do not hesitate to contact me with any questions or if I can be of assistance with any of your other patients' GI needs.     Signed By: Quirino Martinez MD                        December 16, 2022

## 2022-12-16 NOTE — PERIOP NOTES
Anesthesia staff at patient's bedside administering anesthesia and monitoring patients vital signs throughout procedure. See anesthesia note. Endoscope was pre-cleaned at bedside immediately following procedure by woody. Pt transported to recovery, placed on bedside cardiac monitor. VSS. Report given to PACU ROSIE Knutson using SBAR format. Opportunity for questions provided, and answered.

## 2023-05-18 RX ORDER — ROSUVASTATIN CALCIUM 40 MG/1
TABLET, COATED ORAL
COMMUNITY
Start: 2022-11-04

## 2023-05-18 RX ORDER — LISINOPRIL 5 MG/1
TABLET ORAL
COMMUNITY
Start: 2022-11-04

## 2023-05-18 RX ORDER — NEBIVOLOL 5 MG/1
TABLET ORAL DAILY
COMMUNITY

## 2023-05-18 RX ORDER — ASPIRIN 81 MG/1
162 TABLET ORAL DAILY
COMMUNITY

## 2023-05-18 RX ORDER — CALCIUM CARBONATE/VITAMIN D3 500 MG-10
TABLET ORAL
COMMUNITY

## 2023-08-09 ENCOUNTER — HOSPITAL ENCOUNTER (OUTPATIENT)
Facility: HOSPITAL | Age: 68
Discharge: HOME | End: 2023-08-12
Payer: MEDICARE

## 2023-08-09 VITALS — HEIGHT: 65 IN | BODY MASS INDEX: 25.16 KG/M2 | WEIGHT: 151 LBS

## 2023-08-09 DIAGNOSIS — Z12.31 VISIT FOR SCREENING MAMMOGRAM: ICD-10-CM

## 2023-08-09 PROCEDURE — 77063 BREAST TOMOSYNTHESIS BI: CPT

## 2024-08-16 ENCOUNTER — TRANSCRIBE ORDERS (OUTPATIENT)
Dept: ADMINISTRATIVE | Age: 69
End: 2024-08-16

## 2024-08-16 DIAGNOSIS — Z12.31 VISIT FOR SCREENING MAMMOGRAM: Primary | ICD-10-CM

## 2024-08-23 ENCOUNTER — HOSPITAL ENCOUNTER (OUTPATIENT)
Facility: HOSPITAL | Age: 69
End: 2024-08-23
Payer: MEDICARE

## 2024-08-23 DIAGNOSIS — Z12.31 VISIT FOR SCREENING MAMMOGRAM: ICD-10-CM

## 2024-08-23 PROCEDURE — 77063 BREAST TOMOSYNTHESIS BI: CPT

## 2025-08-06 ENCOUNTER — TRANSCRIBE ORDERS (OUTPATIENT)
Facility: HOSPITAL | Age: 70
End: 2025-08-06

## 2025-08-06 DIAGNOSIS — Z12.31 OTHER SCREENING MAMMOGRAM: Primary | ICD-10-CM

## 2025-08-25 ENCOUNTER — HOSPITAL ENCOUNTER (OUTPATIENT)
Facility: HOSPITAL | Age: 70
Discharge: HOME | End: 2025-08-28
Payer: MEDICARE

## 2025-08-25 DIAGNOSIS — Z12.31 OTHER SCREENING MAMMOGRAM: ICD-10-CM

## 2025-08-25 PROCEDURE — 77063 BREAST TOMOSYNTHESIS BI: CPT

## (undated) DEVICE — BAG BELONG PT PERS CLEAR HANDL

## (undated) DEVICE — (D)SENSOR RMFG 02 PULS OXMTR -- DISC BY MFR USE ITEM 133445

## (undated) DEVICE — BASIN EMSIS 16OZ GRAPHITE PLAS KID SHP MOLD GRAD FOR ORAL

## (undated) DEVICE — FCPS RAD JAW 4LC 240CM W/NDL -- BX/40

## (undated) DEVICE — SYR 3ML LL TIP 1/10ML GRAD --

## (undated) DEVICE — Device

## (undated) DEVICE — TRAP,MUCUS SPECIMEN, 80CC: Brand: MEDLINE

## (undated) DEVICE — SOLIDIFIER FLD 2OZ 1500CC N DISINF IN BTL DISP SAFESORB

## (undated) DEVICE — CUFF RMFG BP INF SZ 11 DISP -- LAWSON OEM ITEM 238915

## (undated) DEVICE — CANN NASAL O2 CAPNOGRAPHY AD -- FILTERLINE

## (undated) DEVICE — CATH IV AUTOGRD BC PNK 20GA 25 -- INSYTE

## (undated) DEVICE — CONTAINER SPEC 20 ML LID NEUT BUFF FORMALIN 10 % POLYPR STS

## (undated) DEVICE — THE PARA-PAK SYSTEMS PROVIDE STANDARDIZED PROCEDURES FOR THE ROUTINE COLLECTION, TRANSPORTATION, PRESERVATION AND EXAMINATION OF STOOL SPECIMENS FOR INTESTINAL PARASITES. KIT SYSTEMS ARE DESIGNED FOR EASY USE BY INDIVIDUALS NOT TRAINED IN MICROBIOLOGICAL PROCEDURES AND AFFORD AN EXCELLENT MEANS OF MINIMIZING THE ADVERSE EFFECTS OF DELAY IN SPECIMEN TRANSPORT.: Brand: PARA-PAK LV-PVA / 10% FORMALIN / CLEAN

## (undated) DEVICE — ELECTRODE,RADIOTRANSLUCENT,FOAM,3PK: Brand: MEDLINE

## (undated) DEVICE — SOLIDIFIER MEDC 1200ML -- CONVERT TO 356117

## (undated) DEVICE — SET ADMIN 16ML TBNG L100IN 2 Y INJ SITE IV PIGGY BK DISP (ORDER IN MULIPLES OF 48)

## (undated) DEVICE — BITEBLOCK ENDOSCP 60FR MAXI WHT POLYETH STURDY W/ VELC WVN

## (undated) DEVICE — BAG SPEC BIOHZRD 10 X 10 IN --

## (undated) DEVICE — SIMPLICITY FLUFF UNDERPAD 23X36, MODERATE: Brand: SIMPLICITY

## (undated) DEVICE — SET GRAV CK VLV NEEDLESS ST 3 GANGED 4WAY STPCOCK HI FLO 10

## (undated) DEVICE — KIT COLON W/ 1.1OZ LUB AND 2 END

## (undated) DEVICE — CANNULA CUSH AD W/ 14FT TBG

## (undated) DEVICE — 1200 GUARD II KIT W/5MM TUBE W/O VAC TUBE: Brand: GUARDIAN